# Patient Record
Sex: FEMALE | Race: WHITE
[De-identification: names, ages, dates, MRNs, and addresses within clinical notes are randomized per-mention and may not be internally consistent; named-entity substitution may affect disease eponyms.]

---

## 2018-03-05 ENCOUNTER — HOSPITAL ENCOUNTER (OUTPATIENT)
Dept: HOSPITAL 91 - GIL | Age: 73
Discharge: HOME | End: 2018-03-05
Payer: MEDICARE

## 2018-03-05 ENCOUNTER — HOSPITAL ENCOUNTER (OUTPATIENT)
Age: 73
Discharge: HOME | End: 2018-03-05

## 2018-03-05 DIAGNOSIS — K21.0: ICD-10-CM

## 2018-03-05 DIAGNOSIS — K29.60: ICD-10-CM

## 2018-03-05 DIAGNOSIS — K44.9: Primary | ICD-10-CM

## 2018-03-05 PROCEDURE — 87081 CULTURE SCREEN ONLY: CPT

## 2018-03-05 PROCEDURE — 43239 EGD BIOPSY SINGLE/MULTIPLE: CPT

## 2019-07-29 ENCOUNTER — HOSPITAL ENCOUNTER (OUTPATIENT)
Dept: HOSPITAL 10 - GIL | Age: 74
Discharge: HOME | End: 2019-07-29
Attending: INTERNAL MEDICINE
Payer: MEDICARE

## 2019-07-29 ENCOUNTER — HOSPITAL ENCOUNTER (OUTPATIENT)
Dept: HOSPITAL 91 - GIL | Age: 74
Discharge: HOME | End: 2019-07-29
Payer: MEDICARE

## 2019-07-29 VITALS
BODY MASS INDEX: 26.29 KG/M2 | WEIGHT: 142.86 LBS | HEIGHT: 62 IN | WEIGHT: 142.86 LBS | HEIGHT: 62 IN | BODY MASS INDEX: 26.29 KG/M2

## 2019-07-29 VITALS — DIASTOLIC BLOOD PRESSURE: 62 MMHG | SYSTOLIC BLOOD PRESSURE: 135 MMHG | RESPIRATION RATE: 14 BRPM | HEART RATE: 58 BPM

## 2019-07-29 DIAGNOSIS — K21.0: Primary | ICD-10-CM

## 2019-07-29 DIAGNOSIS — Z86.010: ICD-10-CM

## 2019-07-29 DIAGNOSIS — K29.70: ICD-10-CM

## 2019-07-29 DIAGNOSIS — M19.90: ICD-10-CM

## 2019-07-29 DIAGNOSIS — K57.30: ICD-10-CM

## 2019-07-29 PROCEDURE — 88312 SPECIAL STAINS GROUP 1: CPT

## 2019-07-29 PROCEDURE — 88305 TISSUE EXAM BY PATHOLOGIST: CPT

## 2019-07-29 PROCEDURE — 43239 EGD BIOPSY SINGLE/MULTIPLE: CPT

## 2019-07-29 NOTE — PAC
Date/Time of Note


Date/Time of Note


DATE: 7/29/19 


TIME: 08:08





Post-Anesthesia Notes


Post-Anesthesia Note


Last documented vital signs





Vital Signs


  Date     Temp     Pulse  Resp   B/P (MAP)  Pulse Ox  O2         O2 Flow   FiO2


Time                                                   Delivery   Rate


  7/29/19  97.5
98  58
60  14
16     135/62     98
96  Room


07:27
080                         (86)
109/            Air
face


        4                                56            mask 6L





Activity:  WNL


Respiratory function:  WNL


Cardiovascular function:  WNL


Mental status:  Baseline


Pain reasonably controlled:  Yes


Hydration appropriate:  Yes


Nausea/Vomiting absent:  Yes











FELECIA ESPITIA                  Jul 29, 2019 08:10

## 2019-07-29 NOTE — CONS
DATE OF ADMISSION: 07/29/2019

DATE OF CONSULTATION:  

 

 

 

PATIENT NAME: RANDI MEI

 

TYPE OF CONSULTATION:  Preoperative gastroenterology

 

HISTORY OF PRESENT ILLNESS:  Ms. Randi Mei is a 74-year-old female patient who has been referred
 to me for further evaluation of upper abdominal pain and chronic heartburn, not responding to therap
y.  She has been taking omeprazole and Zantac.  She takes ibuprofen off and on for arthritis.  Her ap
petite has been good, and no weight loss.  No history of gallstones or liver disease.  She has histor
y of colon polyps and diverticulosis of the colon.  She denies any change in the bowel habit or recta
l bleeding.  The patient had colonoscopy 3 years ago.  Not a hypertensive or diabetic.  No heart dise
ase, lung problem, or kidney disease.  Status post hysterectomy and appendectomy.

 

SOCIAL HISTORY:  Nonsmoker.  No alcohol abuse.

 

FAMILY HISTORY:  No family history of gastrointestinal tract neoplasm.

 

ALLERGIES:  NO DRUG ALLERGIES.

 

MEDICATIONS:

1.  Omeprazole 40 mg p.o. q.a.m.

2.  Zantac 300 mg p.o. at bedtime.

3.  Ibuprofen 400 mg p.o. t.i.d.

 

PHYSICAL EXAMINATION: 

VITAL SIGNS:  She is 5 feet 2 inches tall and weighs 141 pounds.  BMI 25, blood pressure 124/72.

HEART:  Normal heart sounds.

LUNGS:  Clear.

ABDOMEN:  Soft, no masses.  Normal bowel sounds.

NEUROLOGIC:  Normal.

 

IMPRESSION:

1.  Upper abdominal pain and chronic heartburn, not responding to therapy with omeprazole and Zantac.


2.  The patient takes ibuprofen for arthritis.

3.  History of colon polyps.

4.  Diverticulosis of the colon.

5.  The patient had last colonoscopy 3 years ago.

6.  Arthritis.

7.  Status post hysterectomy and appendectomy.

 

PLAN:

1.  Continue omeprazole and Zantac.

2.  Advised high-fiber diet.

3.  Endoscopic examination for further evaluation.

4.  Abdominal ultrasound.

 

The procedure and possible complications are well explained to the patient.  She understands and cons
ents to the procedure.

 

 

Dictated By: HAYES BARRAGAN/BLANQUITA

DD:    07/15/2019 16:55:30

DT:    07/15/2019 22:40:35

Conf#: [id number]

DID#:  [id number]

## 2023-04-17 NOTE — PREAC
Date/Time of Note


Date/Time of Note


DATE: 7/29/19 


TIME: 07:51





Anesthesia Eval and Record


Evaluation


Time Pre-Procedure Interview


DATE: 7/29/19 


TIME: 07:51


Age


74


Sex


female


NPO:  8 hrs


Preoperative diagnosis


reflux esophagitis, abd pain


Planned procedure


EGD





Past Medical History


Past Medical History:  Includes (arthritis, diverticulosis, gastritis)





Surgery & Anesthesia Issues


No known issue





Meds


Anticoagulation:  No


Beta Blocker within 24 hr:  No


Reason Beta Blocker not given:  Pt. not on B-Blocker


Reported Medications


Omeprazole* (Omeprazole*) 40 Mg Capsule.dr, 40 MG PO DAILY, #30 CAP


   3/5/18


[Ranitidine]   No Conflict Check, PO


   6/23/16


Ibuprofen* (Ibuprofen*) 800 Mg Tab, 800 MG PO DAILY PRN for PAIN, TAB


   4/14/15


Discontinued Reported Medications


Aspirin (Aspirin) 81 Mg Chew, 81 MG PO DAILY, TAB.CHEW


   4/14/15


Meds reviewed:  Yes





Allergies


Coded Allergies:  


     No Known Allergy (Unverified , 7/29/19)


Allergies Reviewed:  Yes





Labs/Studies


Labs Reviewed:  Reviewed by anesthesiologist


Pregnancy test:  N/A





Pre-procedure Exam


Last vitals





Vital Signs


  Date      Temp  Pulse  Resp  B/P (MAP)   Pulse Ox  O2          O2 Flow    FiO2


Time                                                 Delivery    Rate


   7/29/19  97.5     58    14      135/62        98  Room Air


     07:27                           (86)





Airway:  Adequate mouth opening, Adequate thyromental dist


Mallampati:  Mallampati II


Teeth:  Normal


Lung:  Normal


Heart:  Normal





ASA Physical Status


ASA physical status:  2


Emergency:  None





Planned Anesthetic


General/MAC:  MAC





Pre-operative Attestations


Prior to commencing anesthesia and surgery, the patient was re-evaluated, there 


was verification of:


*The patient's identity


*The results of appropriate recent lab work and preoperative vital signs


*The above evaluation not changing prior to induction


*Anesthetic plan, risk benefits, alternative and complications discussed with 


patient/family; questions answered; patient/family understands, accepts and 


wishes to proceed.











FELECIA ESPITIA                  Jul 29, 2019 07:51 No